# Patient Record
Sex: FEMALE | Race: WHITE | NOT HISPANIC OR LATINO | Employment: OTHER | URBAN - METROPOLITAN AREA
[De-identification: names, ages, dates, MRNs, and addresses within clinical notes are randomized per-mention and may not be internally consistent; named-entity substitution may affect disease eponyms.]

---

## 2017-07-05 ENCOUNTER — HOSPITAL ENCOUNTER (EMERGENCY)
Facility: HOSPITAL | Age: 72
Discharge: HOME | End: 2017-07-05
Payer: MEDICARE

## 2017-07-05 VITALS
TEMPERATURE: 100 F | WEIGHT: 134 LBS | SYSTOLIC BLOOD PRESSURE: 140 MMHG | HEART RATE: 79 BPM | BODY MASS INDEX: 21.63 KG/M2 | OXYGEN SATURATION: 96 % | RESPIRATION RATE: 20 BRPM | DIASTOLIC BLOOD PRESSURE: 59 MMHG
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
## 2017-07-05 DIAGNOSIS — H70.12 CHRONIC MASTOIDITIS, LEFT EAR: ICD-10-CM

## 2017-07-05 DIAGNOSIS — R07.89 CHEST WALL PAIN: ICD-10-CM

## 2017-07-05 DIAGNOSIS — S09.90XA HEAD INJURY DUE TO TRAUMA, INITIAL ENCOUNTER: ICD-10-CM

## 2017-07-05 PROCEDURE — 99284 EMERGENCY DEPT VISIT MOD MDM: CPT

## 2017-07-05 PROCEDURE — 25000003 PHARM REV CODE 250: Performed by: PHYSICIAN ASSISTANT

## 2017-07-05 RX ORDER — AZITHROMYCIN 250 MG/1
250 TABLET, FILM COATED ORAL DAILY
Qty: 6 TABLET | Refills: 0 | Status: SHIPPED | OUTPATIENT
Start: 2017-07-05 | End: 2017-07-15

## 2017-07-05 RX ORDER — ACETAMINOPHEN 325 MG/1
650 TABLET ORAL
Status: COMPLETED | OUTPATIENT
Start: 2017-07-05 | End: 2017-07-05

## 2017-07-05 RX ORDER — HYDROCODONE BITARTRATE AND ACETAMINOPHEN 5; 325 MG/1; MG/1
0.5 TABLET ORAL EVERY 4 HOURS PRN
Qty: 5 TABLET | Refills: 0 | Status: SHIPPED | OUTPATIENT
Start: 2017-07-05

## 2017-07-05 RX ADMIN — ACETAMINOPHEN 650 MG: 325 TABLET ORAL at 03:07
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
## 2017-12-01 ENCOUNTER — HOSPITAL ENCOUNTER (EMERGENCY)
Facility: HOSPITAL | Age: 72
Discharge: HOME | End: 2017-12-01
Attending: EMERGENCY MEDICINE
Payer: MEDICARE

## 2017-12-01 VITALS
WEIGHT: 134 LBS | RESPIRATION RATE: 18 BRPM | BODY MASS INDEX: 21.53 KG/M2 | SYSTOLIC BLOOD PRESSURE: 146 MMHG | TEMPERATURE: 99 F | DIASTOLIC BLOOD PRESSURE: 81 MMHG | HEART RATE: 67 BPM | OXYGEN SATURATION: 98 % | HEIGHT: 66 IN

## 2017-12-01 DIAGNOSIS — S09.90XA HEAD INJURY: ICD-10-CM
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
## 2017-12-01 DIAGNOSIS — S01.81XA LACERATION OF FOREHEAD, INITIAL ENCOUNTER: Primary | ICD-10-CM

## 2017-12-01 LAB
ALBUMIN SERPL BCP-MCNC: 3.6 G/DL
ALP SERPL-CCNC: 69 U/L
ALT SERPL W/O P-5'-P-CCNC: 19 U/L
ANION GAP SERPL CALC-SCNC: 8 MMOL/L
AST SERPL-CCNC: 22 U/L
BASOPHILS # BLD AUTO: 0.03 K/UL
BASOPHILS NFR BLD: 0.4 %
BILIRUB SERPL-MCNC: 0.4 MG/DL
BNP SERPL-MCNC: 39 PG/ML
BUN SERPL-MCNC: 15 MG/DL
CALCIUM SERPL-MCNC: 10.1 MG/DL
CHLORIDE SERPL-SCNC: 102 MMOL/L
CK SERPL-CCNC: 116 U/L
CO2 SERPL-SCNC: 28 MMOL/L
CREAT SERPL-MCNC: 0.8 MG/DL
DIFFERENTIAL METHOD: NORMAL
EOSINOPHIL # BLD AUTO: 0.1 K/UL
EOSINOPHIL NFR BLD: 1.4 %
ERYTHROCYTE [DISTWIDTH] IN BLOOD BY AUTOMATED COUNT: 13.3 %
EST. GFR  (AFRICAN AMERICAN): >60 ML/MIN/1.73 M^2
EST. GFR  (NON AFRICAN AMERICAN): >60 ML/MIN/1.73 M^2
GLUCOSE SERPL-MCNC: 127 MG/DL
HCT VFR BLD AUTO: 40.3 %
HGB BLD-MCNC: 13.5 G/DL
LYMPHOCYTES # BLD AUTO: 1.9 K/UL
LYMPHOCYTES NFR BLD: 24.3 %
MCH RBC QN AUTO: 29.6 PG
MCHC RBC AUTO-ENTMCNC: 33.5 G/DL
MCV RBC AUTO: 88 FL
MONOCYTES # BLD AUTO: 0.4 K/UL
MONOCYTES NFR BLD: 5.4 %
NEUTROPHILS # BLD AUTO: 5.4 K/UL
NEUTROPHILS NFR BLD: 68.5 %
PLATELET # BLD AUTO: 152 K/UL
PMV BLD AUTO: 11.7 FL
POTASSIUM SERPL-SCNC: 4.7 MMOL/L
PROT SERPL-MCNC: 7.3 G/DL
RBC # BLD AUTO: 4.56 M/UL
SODIUM SERPL-SCNC: 138 MMOL/L
TROPONIN I SERPL DL<=0.01 NG/ML-MCNC: 0.01 NG/ML
WBC # BLD AUTO: 7.9 K/UL

## 2017-12-01 PROCEDURE — 84484 ASSAY OF TROPONIN QUANT: CPT

## 2017-12-01 PROCEDURE — 99285 EMERGENCY DEPT VISIT HI MDM: CPT | Mod: 25

## 2017-12-01 PROCEDURE — 93005 ELECTROCARDIOGRAM TRACING: CPT

## 2017-12-01 PROCEDURE — 85025 COMPLETE CBC W/AUTO DIFF WBC: CPT

## 2017-12-01 PROCEDURE — 12013 RPR F/E/E/N/L/M 2.6-5.0 CM: CPT

## 2017-12-01 PROCEDURE — 25000003 PHARM REV CODE 250: Performed by: EMERGENCY MEDICINE

## 2017-12-01 PROCEDURE — 93010 ELECTROCARDIOGRAM REPORT: CPT | Mod: ,,, | Performed by: INTERNAL MEDICINE

## 2017-12-01 PROCEDURE — 83880 ASSAY OF NATRIURETIC PEPTIDE: CPT

## 2017-12-01 PROCEDURE — 82550 ASSAY OF CK (CPK): CPT

## 2017-12-01 PROCEDURE — 80053 COMPREHEN METABOLIC PANEL: CPT

## 2017-12-01 RX ORDER — ACETAMINOPHEN 325 MG/1
325 TABLET ORAL EVERY 6 HOURS PRN
Refills: 0 | COMMUNITY
Start: 2017-12-01

## 2017-12-01 RX ORDER — ACETAMINOPHEN 500 MG
1000 TABLET ORAL
Status: COMPLETED | OUTPATIENT
Start: 2017-12-01 | End: 2017-12-01

## 2017-12-01 RX ADMIN — ACETAMINOPHEN 1000 MG: 500 TABLET ORAL at 08:12
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
## 2017-12-02 NOTE — ED NOTES
Spoke to [place] [facility]. They are sending taxi with a TMor aid to send patient back to home. Confirmed with Charge nurse, Tam and Dr. Mejia.

## 2017-12-02 NOTE — DISCHARGE INSTRUCTIONS
The patient has family members that will observe him/her over the next 24 hours and that are competent to bring him/her back to the Emergency Department if concerning signs or symptoms develop. The family members are comfortable with this responsibility.  I have given detailed written and verbal instructions to the family to bring the patient back to the ED should any concerning signs such as excessive sleepiness, lethargy, confusion, unequal pupils, recurrent vomiting, seizure activity, loss of consciousness, or focal weakness develop.    Regarding LACERATION CARE, patient was instructed to wash hands with soap and warm water before and after caring for wound; keep the wound dry for the first 24 to 48 hours and then gently clean the wound once or twice a day with cool water using soap to clean around the wound; avoid using alcohol or hydrogen peroxide to clean wound unless directed to; and use bandages to keep wound clean and protected and to prevent swelling.  Advised patient to contact primary healthcare provider if wound splits open; becomes extremely painful; appears to not be healing; has a foreign object present; develop a purulent discharge; or note the skin around the wound becoming numb, edematous, or erythematous.  Patient instructed to follow up with primary care provider for wound re-check or closure removal in 8-10 days.

## 2017-12-02 NOTE — ED NOTES
Level of Consciousness: The patient is awake, alert, and oriented with appropriate affect and speech; oriented to person,  Appearance: Sitting up in bed with no acute distress noted. Clothing and hygiene are clean and worn appropriately.  Skin: Skin is warm and dry with good skin turgor; intact; color consistent with ethnicity.  Mucous membranes are moist. Laceration to forehead. Bleeding controlled.   Musculoskeletal: Contracture to right arm.   Respiratory: Airway open and patent, respirations spontaneous, even and unlabored. No accessory muscles in use.   Cardiac: Regular rate and rhythm, good pulses palpated peripherally, capillary refill < 3 seconds.  Abdomen: Soft, non-tender to palpation. No distention noted.  Neurologic: PERRLA, face exhibits symmetrical expression, hand grasps equal and even bilaterally, normal sensation noted to all extremities and face when touched by a finger.

## 2018-02-21 ENCOUNTER — PES CALL (OUTPATIENT)
Dept: ADMINISTRATIVE | Facility: CLINIC | Age: 73
End: 2018-02-21

## 2019-01-02 ENCOUNTER — PATIENT OUTREACH (OUTPATIENT)
Dept: ADMINISTRATIVE | Facility: HOSPITAL | Age: 74
End: 2019-01-02

## 2019-03-20 ENCOUNTER — PES CALL (OUTPATIENT)
Dept: ADMINISTRATIVE | Facility: CLINIC | Age: 74
End: 2019-03-20

## 2019-07-19 ENCOUNTER — PES CALL (OUTPATIENT)
Dept: ADMINISTRATIVE | Facility: CLINIC | Age: 74
End: 2019-07-19